# Patient Record
(demographics unavailable — no encounter records)

---

## 2024-10-07 NOTE — HISTORY OF PRESENT ILLNESS
[FreeTextEntry1] : Last pap: 1996 CARLOS MANUEL/BSO for endometrial cancer Last mammography:6/17/20 neg. 2/1/18 negative Last screening breast ultrasound: Last bone densitometry: 6/17/20 T -0.7 at L2-4; left hip -1.5; left femoral neck -1.8 (Z +0.3). 10 year fracture risk 11 and 2.4%.. 2/1/18 T -1.1 at L2-4; hip -1.3; femoral neck -1.5. Ten year fracture risk 10 and 1.7% Last colonoscopy: 2014 - 10 yr repeat planned. Breast tissue density: Scattered fibroglandular  [TextBox_4] : 1996 CARLOS MANUEL/BSO for endometrial ca - superficial. Good bladder control. Normal BM's. Pt had Covid vaccine +  boosters incl Spikevax & is feeling well. No Gyn complaints such as pain or PMB.  FH of EM ca - pt had a neg [employer] 91 gene testing - no Wilkerson syndrome or other mutation assoc w/ EM ca. Sees Dr. Hernandez for R thyroid nodule - that has been bx'd. Dr. Hernandez is also rx'ing pt w/ Prolia. Last shot was in 9/2024  Mmg & thyroid u/s 10/7/24 am  Colonoscopy planned in 2024 - at [facility]. No GI or  prob's. [Mammogramdate] : 7/1/21 [TextBox_19] : Scattered fibroglandular; negative [PapSmeardate] : 1996 [BoneDensityDate] : 2023 [TextBox_31] : 1996 CARLOS MANUEL/BSO for EM ca. [TextBox_37] : 6/17/20-T -0.7 at L2-4; left hip -1.5; left femoral neck -1.8 (Z +0.3). 10 year fracture risk 11 and 2.4%.. [ColonoscopyDate] : 2024 [TextBox_43] : In [place] - Normal but scar tissue prevented access to distal colon. CT was then done.

## 2024-10-07 NOTE — PHYSICAL EXAM
[Awake] : awake [Alert] : alert [Thyroid Nodule] : thyroid nodule [Soft] : soft [Oriented x3] : oriented to person, place, and time [Vulvar Atrophy] : vulvar atrophy [Normal] : vagina [Atrophy] : atrophy [No Bleeding] : there was no active vaginal bleeding [Absent] : absent [Uterine Adnexae] : were not tender and not enlarged [Nl Sphincter Tone] : normal sphincter tone [Chaperone Present] : A chaperone was present in the examining room during all aspects of the physical examination [Acute Distress] : no acute distress [Mass] : no breast mass [Nipple Discharge] : no nipple discharge [Axillary LAD] : no axillary lymphadenopathy [Tender] : non tender [Ovarian Mass (___ Cm)] : there were no adnexal masses [FreeTextEntry4] : Tight 2 FB introitus [FreeTextEntry9] : no masses; smooth nodules present in L pelvis c/w diverticulosis

## 2024-10-07 NOTE — HISTORY OF PRESENT ILLNESS
[FreeTextEntry1] : Last pap: 1996 CARLOS MANUEL/BSO for endometrial cancer Last mammography:6/17/20 neg. 2/1/18 negative Last screening breast ultrasound: Last bone densitometry: 6/17/20 T -0.7 at L2-4; left hip -1.5; left femoral neck -1.8 (Z +0.3). 10 year fracture risk 11 and 2.4%.. 2/1/18 T -1.1 at L2-4; hip -1.3; femoral neck -1.5. Ten year fracture risk 10 and 1.7% Last colonoscopy: 2014 - 10 yr repeat planned. Breast tissue density: Scattered fibroglandular  [TextBox_4] : 1996 CARLOS MANUEL/BSO for endometrial ca - superficial. Good bladder control. Normal BM's. Pt had Covid vaccine +  boosters incl Spikevax & is feeling well. No Gyn complaints such as pain or PMB.  FH of EM ca - pt had a neg [employer] 91 gene testing - no Wilkerson syndrome or other mutation assoc w/ EM ca. Sees Dr. Hernandez for R thyroid nodule - that has been bx'd. Dr. Hernandez is also rx'ing pt w/ Prolia. Last shot was in 9/2024  Mmg & thyroid u/s 10/7/24 am  Colonoscopy planned in 2024 - at [facility]. No GI or  prob's. [Mammogramdate] : 7/1/21 [TextBox_19] : Scattered fibroglandular; negative [PapSmeardate] : 1996 [BoneDensityDate] : 2023 [TextBox_31] : 1996 CARLOS MANUEL/BSO for EM ca. [TextBox_37] : 6/17/20-T -0.7 at L2-4; left hip -1.5; left femoral neck -1.8 (Z +0.3). 10 year fracture risk 11 and 2.4%.. [ColonoscopyDate] : 2024 [TextBox_43] : In [place] - Normal but scar tissue prevented access to distal colon. CT was then done.

## 2024-10-07 NOTE — ASSESSMENT
[TextEntry] : Doing well. Marelys d/w pt who likely has diverticulosis. She will send a copy of her most recent colonoscopy.
Patient requests all Lab, Cardiology, and Radiology Results on their Discharge Instructions

## 2024-10-07 NOTE — HISTORY OF PRESENT ILLNESS
[FreeTextEntry1] : Last pap: 1996 CARLOS MANUEL/BSO for endometrial cancer Last mammography:6/17/20 neg. 2/1/18 negative Last screening breast ultrasound: Last bone densitometry: 6/17/20 T -0.7 at L2-4; left hip -1.5; left femoral neck -1.8 (Z +0.3). 10 year fracture risk 11 and 2.4%.. 2/1/18 T -1.1 at L2-4; hip -1.3; femoral neck -1.5. Ten year fracture risk 10 and 1.7% Last colonoscopy: 2014 - 10 yr repeat planned. Breast tissue density: Scattered fibroglandular  [TextBox_4] : 1996 CARLOS MANUEL/BSO for endometrial ca - superficial. Good bladder control. Normal BM's. Pt had Covid vaccine +  boosters incl Spikevax & is feeling well. No Gyn complaints such as pain or PMB.  FH of EM ca - pt had a neg [employer] 91 gene testing - no Wilkerson syndrome or other mutation assoc w/ EM ca. Sees Dr. Hernandez for R thyroid nodule - that has been bx'd. Dr. Hernandez is also rx'ing pt w/ Prolia. Last shot was in 9/2024  Mmg & thyroid u/s 10/7/24 am  Colonoscopy planned in 2024 - at [facility]. No GI or  prob's. [Mammogramdate] : 7/1/21 [TextBox_19] : Scattered fibroglandular; negative [PapSmeardate] : 1996 [TextBox_31] : 1996 CARLOS MANUEL/BSO for EM ca. [BoneDensityDate] : 2023 [TextBox_37] : 6/17/20-T -0.7 at L2-4; left hip -1.5; left femoral neck -1.8 (Z +0.3). 10 year fracture risk 11 and 2.4%.. [ColonoscopyDate] : 2024 [TextBox_43] : In [place] - Normal but scar tissue prevented access to distal colon. CT was then done.